# Patient Record
Sex: FEMALE | Race: WHITE | Employment: FULL TIME | URBAN - METROPOLITAN AREA
[De-identification: names, ages, dates, MRNs, and addresses within clinical notes are randomized per-mention and may not be internally consistent; named-entity substitution may affect disease eponyms.]

---

## 2023-10-26 ENCOUNTER — TELEPHONE (OUTPATIENT)
Dept: PRIMARY CARE | Facility: CLINIC | Age: 63
End: 2023-10-26

## 2023-10-26 ENCOUNTER — APPOINTMENT (OUTPATIENT)
Dept: PRIMARY CARE | Facility: CLINIC | Age: 63
End: 2023-10-26
Payer: COMMERCIAL

## 2023-10-26 DIAGNOSIS — I10 HYPERTENSION, UNSPECIFIED TYPE: ICD-10-CM

## 2023-10-26 DIAGNOSIS — E78.5 HYPERLIPIDEMIA, UNSPECIFIED HYPERLIPIDEMIA TYPE: ICD-10-CM

## 2023-10-26 DIAGNOSIS — E55.9 VITAMIN D DEFICIENCY: ICD-10-CM

## 2023-10-26 DIAGNOSIS — R73.01 IFG (IMPAIRED FASTING GLUCOSE): ICD-10-CM

## 2023-10-26 DIAGNOSIS — Z00.00 ROUTINE MEDICAL EXAM: ICD-10-CM

## 2023-11-07 ENCOUNTER — LAB (OUTPATIENT)
Dept: LAB | Facility: LAB | Age: 63
End: 2023-11-07
Payer: COMMERCIAL

## 2023-11-07 DIAGNOSIS — Z00.00 ROUTINE MEDICAL EXAM: ICD-10-CM

## 2023-11-07 DIAGNOSIS — I10 HYPERTENSION, UNSPECIFIED TYPE: ICD-10-CM

## 2023-11-07 DIAGNOSIS — E78.5 HYPERLIPIDEMIA, UNSPECIFIED HYPERLIPIDEMIA TYPE: ICD-10-CM

## 2023-11-07 DIAGNOSIS — R73.01 IFG (IMPAIRED FASTING GLUCOSE): ICD-10-CM

## 2023-11-07 DIAGNOSIS — E55.9 VITAMIN D DEFICIENCY: ICD-10-CM

## 2023-11-07 LAB
ALBUMIN SERPL BCP-MCNC: 4.6 G/DL (ref 3.4–5)
ALP SERPL-CCNC: 91 U/L (ref 33–136)
ALT SERPL W P-5'-P-CCNC: 19 U/L (ref 7–45)
ANION GAP SERPL CALC-SCNC: 11 MMOL/L (ref 10–20)
APPEARANCE UR: CLEAR
AST SERPL W P-5'-P-CCNC: 16 U/L (ref 9–39)
BILIRUB SERPL-MCNC: 1.2 MG/DL (ref 0–1.2)
BILIRUB UR STRIP.AUTO-MCNC: NEGATIVE MG/DL
BUN SERPL-MCNC: 10 MG/DL (ref 6–23)
CALCIUM SERPL-MCNC: 10.7 MG/DL (ref 8.6–10.3)
CHLORIDE SERPL-SCNC: 96 MMOL/L (ref 98–107)
CHOLEST SERPL-MCNC: 238 MG/DL (ref 0–199)
CHOLESTEROL/HDL RATIO: 3
CO2 SERPL-SCNC: 29 MMOL/L (ref 21–32)
COLOR UR: ABNORMAL
CREAT SERPL-MCNC: 0.65 MG/DL (ref 0.5–1.05)
GFR SERPL CREATININE-BSD FRML MDRD: >90 ML/MIN/1.73M*2
GLUCOSE SERPL-MCNC: 85 MG/DL (ref 74–99)
GLUCOSE UR STRIP.AUTO-MCNC: NEGATIVE MG/DL
HDLC SERPL-MCNC: 79.3 MG/DL
KETONES UR STRIP.AUTO-MCNC: NEGATIVE MG/DL
LDLC SERPL CALC-MCNC: 139 MG/DL
LEUKOCYTE ESTERASE UR QL STRIP.AUTO: NEGATIVE
NITRITE UR QL STRIP.AUTO: NEGATIVE
NON HDL CHOLESTEROL: 159 MG/DL (ref 0–149)
PH UR STRIP.AUTO: 7 [PH]
POTASSIUM SERPL-SCNC: 4.1 MMOL/L (ref 3.5–5.3)
PROT SERPL-MCNC: 7 G/DL (ref 6.4–8.2)
PROT UR STRIP.AUTO-MCNC: NEGATIVE MG/DL
RBC # UR STRIP.AUTO: ABNORMAL /UL
RBC #/AREA URNS AUTO: NORMAL /HPF
SODIUM SERPL-SCNC: 132 MMOL/L (ref 136–145)
SP GR UR STRIP.AUTO: 1
TRIGL SERPL-MCNC: 100 MG/DL (ref 0–149)
UROBILINOGEN UR STRIP.AUTO-MCNC: <2 MG/DL
VLDL: 20 MG/DL (ref 0–40)
WBC #/AREA URNS AUTO: NORMAL /HPF

## 2023-11-07 PROCEDURE — 36415 COLL VENOUS BLD VENIPUNCTURE: CPT

## 2023-11-07 PROCEDURE — 82570 ASSAY OF URINE CREATININE: CPT

## 2023-11-07 PROCEDURE — 82043 UR ALBUMIN QUANTITATIVE: CPT

## 2023-11-07 PROCEDURE — 81001 URINALYSIS AUTO W/SCOPE: CPT

## 2023-11-07 PROCEDURE — 82306 VITAMIN D 25 HYDROXY: CPT

## 2023-11-07 PROCEDURE — 80053 COMPREHEN METABOLIC PANEL: CPT

## 2023-11-07 PROCEDURE — 83036 HEMOGLOBIN GLYCOSYLATED A1C: CPT

## 2023-11-07 PROCEDURE — 80061 LIPID PANEL: CPT

## 2023-11-08 PROBLEM — H04.129 DRY EYE: Status: ACTIVE | Noted: 2023-11-08

## 2023-11-08 PROBLEM — Z97.3 WEARS EYEGLASSES: Status: ACTIVE | Noted: 2023-11-08

## 2023-11-08 PROBLEM — E78.49 OTHER HYPERLIPIDEMIA: Status: ACTIVE | Noted: 2019-06-11

## 2023-11-08 PROBLEM — H53.8 BLURRED VISION, BILATERAL: Status: ACTIVE | Noted: 2023-11-08

## 2023-11-08 PROBLEM — H25.13 NUCLEAR SCLEROSIS OF BOTH EYES: Status: ACTIVE | Noted: 2023-11-08

## 2023-11-08 PROBLEM — R73.01 IMPAIRED FASTING GLUCOSE: Status: ACTIVE | Noted: 2018-08-27

## 2023-11-08 PROBLEM — Z97.3 WEARS CONTACT LENSES: Status: ACTIVE | Noted: 2023-11-08

## 2023-11-08 PROBLEM — R92.8 ABNORMAL MAMMOGRAM: Status: ACTIVE | Noted: 2023-11-08

## 2023-11-08 PROBLEM — H57.10 PAIN IN AND AROUND EYE: Status: ACTIVE | Noted: 2023-11-08

## 2023-11-08 PROBLEM — F41.9 ANXIETY: Status: ACTIVE | Noted: 2019-08-29

## 2023-11-08 PROBLEM — I10 HYPERTENSION: Status: ACTIVE | Noted: 2018-08-27

## 2023-11-08 PROBLEM — E55.9 VITAMIN D DEFICIENCY: Status: ACTIVE | Noted: 2022-04-25

## 2023-11-08 PROBLEM — E83.52 HYPERCALCEMIA: Status: ACTIVE | Noted: 2021-09-15

## 2023-11-08 PROBLEM — H26.9 CATARACT OF RIGHT EYE: Status: ACTIVE | Noted: 2023-11-08

## 2023-11-08 LAB
25(OH)D3 SERPL-MCNC: 21 NG/ML (ref 30–100)
CREAT UR-MCNC: 17.7 MG/DL (ref 20–320)
EST. AVERAGE GLUCOSE BLD GHB EST-MCNC: 100 MG/DL
HBA1C MFR BLD: 5.1 %
MICROALBUMIN UR-MCNC: <7 MG/L
MICROALBUMIN/CREAT UR: ABNORMAL MG/G{CREAT}

## 2023-11-08 RX ORDER — IBUPROFEN 800 MG/1
TABLET ORAL
COMMUNITY
End: 2024-05-20 | Stop reason: ALTCHOICE

## 2023-11-08 RX ORDER — IBUPROFEN 200 MG
200 TABLET ORAL EVERY 6 HOURS PRN
COMMUNITY

## 2023-11-08 RX ORDER — LISINOPRIL 10 MG/1
10 TABLET ORAL DAILY
COMMUNITY
End: 2024-01-08

## 2023-11-08 RX ORDER — MUPIROCIN 20 MG/G
OINTMENT TOPICAL 3 TIMES DAILY
COMMUNITY
Start: 2017-07-09 | End: 2023-11-09 | Stop reason: WASHOUT

## 2023-11-09 ENCOUNTER — OFFICE VISIT (OUTPATIENT)
Dept: PRIMARY CARE | Facility: CLINIC | Age: 63
End: 2023-11-09
Payer: COMMERCIAL

## 2023-11-09 VITALS
DIASTOLIC BLOOD PRESSURE: 82 MMHG | SYSTOLIC BLOOD PRESSURE: 126 MMHG | HEIGHT: 66 IN | OXYGEN SATURATION: 98 % | BODY MASS INDEX: 34.87 KG/M2 | HEART RATE: 60 BPM | WEIGHT: 217 LBS

## 2023-11-09 DIAGNOSIS — E55.9 VITAMIN D DEFICIENCY: ICD-10-CM

## 2023-11-09 DIAGNOSIS — I10 HYPERTENSION, UNSPECIFIED TYPE: ICD-10-CM

## 2023-11-09 DIAGNOSIS — E78.5 HYPERLIPIDEMIA, UNSPECIFIED HYPERLIPIDEMIA TYPE: ICD-10-CM

## 2023-11-09 DIAGNOSIS — Z00.00 ROUTINE MEDICAL EXAM: Primary | ICD-10-CM

## 2023-11-09 DIAGNOSIS — R73.01 IFG (IMPAIRED FASTING GLUCOSE): ICD-10-CM

## 2023-11-09 DIAGNOSIS — E83.52 HYPERCALCEMIA: ICD-10-CM

## 2023-11-09 PROCEDURE — 99396 PREV VISIT EST AGE 40-64: CPT | Performed by: NURSE PRACTITIONER

## 2023-11-09 PROCEDURE — 3079F DIAST BP 80-89 MM HG: CPT | Performed by: NURSE PRACTITIONER

## 2023-11-09 PROCEDURE — 3074F SYST BP LT 130 MM HG: CPT | Performed by: NURSE PRACTITIONER

## 2023-11-09 PROCEDURE — 1036F TOBACCO NON-USER: CPT | Performed by: NURSE PRACTITIONER

## 2023-11-09 ASSESSMENT — PROMIS GLOBAL HEALTH SCALE
CARRYOUT_SOCIAL_ACTIVITIES: GOOD
RATE_MENTAL_HEALTH: FAIR
RATE_SOCIAL_SATISFACTION: FAIR
RATE_PHYSICAL_HEALTH: FAIR
RATE_QUALITY_OF_LIFE: FAIR
RATE_GENERAL_HEALTH: GOOD
RATE_AVERAGE_FATIGUE: MILD
EMOTIONAL_PROBLEMS: OFTEN
CARRYOUT_PHYSICAL_ACTIVITIES: COMPLETELY
RATE_AVERAGE_PAIN: 0

## 2023-11-09 ASSESSMENT — PAIN SCALES - GENERAL: PAINLEVEL: 0-NO PAIN

## 2023-11-09 NOTE — PROGRESS NOTES
"Subjective   Patient ID: Elvie Blunt is a 63 y.o. female who presents for CPE.    HPI   ELVIE is seen for her comprehensive physical exam.   PMH, PSH, family history and social history were reviewed and updated.     Diet is eats too much,  drinking alcohol  Drinking \"lots\"   Feels down/depressed  Has stressors at home  exercise - bikes walks  nonsmoker - quit 2007. Smoked socially    Has elevated calcium in past  Does not take calcium supplement    Tdap 2015  [id number]   refuses flu  Advised pneumovax  Shingrix 2022    Colonoscopy 2019 -Dr lynn repeat in 10 years    GYN history - Sees a Gynecologist. Dr MIAN Hale  pap 4/23  Mammogram 4/23    1. ELVIE is here for follow-up of hypertension. It was diagnosed years ago since 2006 Patient denies chest pain, shortness of breath and dizziness . ELVIE's current treatment is as listed in RX. She is compliant with treatment. BP at home:Systolic: 120's Diastolic: 80-90  2. ELVIE is seen today for follow-up of Hypercholesterolemia. w/ high HDL ELVIE has the following cardiac risk factors: hypertension. She has a history of no as complications of her lipid disorder.   3. ELVIE is seen also for impaired fasting glucose.   4. ELVIE is seen today also has aches and pain exercises regularly, takes Ibuprofen 800 occasionally, not regularly     BW reviewed    Review of Systems  Constitutional Symptoms: negative for fever, loss of appetite, headaches, fatigue.   Eyes: negative for loss and blurring of vision, double vision.   Ear, Nose, Mouth, Throat: negative for hearing loss, tinnitus, nasal congestion, rhinorrhea, nose bleeds, teeth problems, mouth sores, gum disease, dysphagia, sore throat.   Cardiovascular: negative for chest pain/pressure, palpitations, edema, claudication.   Respiratory: negative for shortness of breath, dyspnea on exertion, pain with breathing, coughing.   Breast: negative for tenderness, masses, gynecomastia.   Gastrointestinal: negative for anorexia, " indigestion, nausea, vomiting, abdominal pain, change in bowel habits, diarrhea, constipation, hematochaezia, melena, blood in stool.    : Negative for urinary or vaginal complaints  Musculoskeletal: negative for joint pain, joint swelling, myalgias, cramps.   Integumentary: negative for change in mole, skin trouble or rash.   Neurological: negative for headache, numbness, tingling, weakness, tremors.   Psychiatric: negative for depression, anxiety.   Endocrine: negative for weight gain, heat or cold intolerance, polyuria, polydipsia, polyphagia.   Hematologic/Lymphatic: negative for bruising, abnormal bleeding, swollen glands.      Objective   Vitals reviewed    Physical Exam  General Appearance: Comfortable. She is well nourished, and well developed. She is awake, alert, and oriented and appears her stated age. The patient is cooperative with exam.  Head: Hair pattern reveals a normal pattern for patients age and The face shows no abnormalities .  Eyes: PERRLA, EOMI, conjunctiva and sclerae clear. Extraocular muscle exam reveals EOMI.  Ears, Nose, Mouth, Throat: Bilateral canals are normal. Both tympanic membranes are pearly gray and landmarks normal .   NOSE: Nasal mucosa in both nostrils reveals no polyps, ulcerations, or lesions. Oral mucosa reveals no abnormalities and Teeth reveal good repair .  Neck: Neck reveals supple, no adenopathy, no thyromegaly, or carotid bruits.  Chest: Lungs are clear to auscultation bilaterally with no wheezes, rales, or rhonchi.  Cardiovascular: RRR without MRG.  Breast: defers.  Abdomen: Abdomen is soft, NT, ND with no masses.  Genitourinary: Defers  Lymph Nodes: Bilateral axillary lymph nodes are unremarkable. Bilateral inguinal lymph nodes are unremarkable.  Musculoskeletal: 5/5 and equal strength in bilateral upper and lower extremities.  Skin: Skin reveals good turgor and no rashes .  Neurological: Neuro: Intact and non-focal. Cranial nerves II - XII are grossly  intact.  Psychiatric: Patient has appropriate judgement. Patient has good insight. Patient's mood is appropriate.    Assessment/Plan   Diagnoses and all orders for this visit:  Routine medical exam  Continue current medications  healthy diet  DASH diet  exercise  Safety  Restart vit d   No calcium supplement  Recheck in 3 months - if not improving, see endo as previously discussed  Hypertension, unspecified type  Hyperlipidemia, unspecified hyperlipidemia type  IFG (impaired fasting glucose)  Vitamin D deficiency  Hypercalcemia  Other orders  -     Follow Up In Primary Care - Established; Future

## 2023-11-09 NOTE — PATIENT INSTRUCTIONS
Thank you for choosing our office for your healthcare needs    Please work on healthy lifestyle, choosing whole foods and regular exercise.    Please be sure to take 2000 IU vitamin D daily  Follow-up in 2 months to recheck your calcium and vitamin D levels    Please see the behavioral wellness group located in [facility] for counseling  They can be reached at [phone]

## 2024-02-15 ENCOUNTER — TELEPHONE (OUTPATIENT)
Dept: PRIMARY CARE | Facility: CLINIC | Age: 64
End: 2024-02-15
Payer: COMMERCIAL

## 2024-02-15 DIAGNOSIS — E55.9 VITAMIN D DEFICIENCY: ICD-10-CM

## 2024-02-15 DIAGNOSIS — I10 HYPERTENSION, UNSPECIFIED TYPE: ICD-10-CM

## 2024-02-22 ENCOUNTER — LAB (OUTPATIENT)
Dept: LAB | Facility: LAB | Age: 64
End: 2024-02-22
Payer: COMMERCIAL

## 2024-02-22 DIAGNOSIS — E55.9 VITAMIN D DEFICIENCY: ICD-10-CM

## 2024-02-22 DIAGNOSIS — I10 HYPERTENSION, UNSPECIFIED TYPE: ICD-10-CM

## 2024-02-22 LAB
25(OH)D3 SERPL-MCNC: 31 NG/ML (ref 31–100)
ALBUMIN SERPL-MCNC: 4.6 G/DL (ref 3.5–5)
ALP BLD-CCNC: 108 U/L (ref 35–125)
ALT SERPL-CCNC: 23 U/L (ref 5–40)
ANION GAP SERPL CALC-SCNC: 10 MMOL/L
AST SERPL-CCNC: 17 U/L (ref 5–40)
BILIRUB SERPL-MCNC: 1.2 MG/DL (ref 0.1–1.2)
BUN SERPL-MCNC: 10 MG/DL (ref 8–25)
CALCIUM SERPL-MCNC: 11 MG/DL (ref 8.5–10.4)
CHLORIDE SERPL-SCNC: 98 MMOL/L (ref 97–107)
CO2 SERPL-SCNC: 26 MMOL/L (ref 24–31)
CREAT SERPL-MCNC: 0.7 MG/DL (ref 0.4–1.6)
EGFRCR SERPLBLD CKD-EPI 2021: >90 ML/MIN/1.73M*2
GLUCOSE SERPL-MCNC: 98 MG/DL (ref 65–99)
PHOSPHATE SERPL-MCNC: 3 MG/DL (ref 2.5–4.5)
POTASSIUM SERPL-SCNC: 4.6 MMOL/L (ref 3.4–5.1)
PROT SERPL-MCNC: 7 G/DL (ref 5.9–7.9)
PTH-INTACT SERPL-MCNC: 89.4 PG/ML (ref 18.5–88)
SODIUM SERPL-SCNC: 134 MMOL/L (ref 133–145)

## 2024-02-22 PROCEDURE — 83970 ASSAY OF PARATHORMONE: CPT

## 2024-02-22 PROCEDURE — 84100 ASSAY OF PHOSPHORUS: CPT

## 2024-02-22 PROCEDURE — 82306 VITAMIN D 25 HYDROXY: CPT

## 2024-02-22 PROCEDURE — 80053 COMPREHEN METABOLIC PANEL: CPT

## 2024-02-22 PROCEDURE — 36415 COLL VENOUS BLD VENIPUNCTURE: CPT

## 2024-02-26 ENCOUNTER — TELEPHONE (OUTPATIENT)
Dept: PRIMARY CARE | Facility: CLINIC | Age: 64
End: 2024-02-26

## 2024-02-26 ENCOUNTER — OFFICE VISIT (OUTPATIENT)
Dept: PRIMARY CARE | Facility: CLINIC | Age: 64
End: 2024-02-26
Payer: COMMERCIAL

## 2024-02-26 VITALS
BODY MASS INDEX: 35.67 KG/M2 | HEART RATE: 74 BPM | OXYGEN SATURATION: 98 % | SYSTOLIC BLOOD PRESSURE: 126 MMHG | DIASTOLIC BLOOD PRESSURE: 80 MMHG | WEIGHT: 221 LBS

## 2024-02-26 DIAGNOSIS — I10 PRIMARY HYPERTENSION: ICD-10-CM

## 2024-02-26 DIAGNOSIS — Z00.00 ROUTINE MEDICAL EXAM: ICD-10-CM

## 2024-02-26 DIAGNOSIS — R79.89 ELEVATED PARATHYROID HORMONE: ICD-10-CM

## 2024-02-26 DIAGNOSIS — E83.52 HYPERCALCEMIA: Primary | ICD-10-CM

## 2024-02-26 DIAGNOSIS — R73.01 IMPAIRED FASTING GLUCOSE: ICD-10-CM

## 2024-02-26 DIAGNOSIS — E78.49 OTHER HYPERLIPIDEMIA: ICD-10-CM

## 2024-02-26 PROCEDURE — 99213 OFFICE O/P EST LOW 20 MIN: CPT | Performed by: NURSE PRACTITIONER

## 2024-02-26 PROCEDURE — 1036F TOBACCO NON-USER: CPT | Performed by: NURSE PRACTITIONER

## 2024-02-26 PROCEDURE — 3074F SYST BP LT 130 MM HG: CPT | Performed by: NURSE PRACTITIONER

## 2024-02-26 PROCEDURE — 3079F DIAST BP 80-89 MM HG: CPT | Performed by: NURSE PRACTITIONER

## 2024-02-26 ASSESSMENT — PAIN SCALES - GENERAL: PAINLEVEL: 0-NO PAIN

## 2024-02-26 NOTE — PROGRESS NOTES
Subjective   Patient ID: Elvie Blunt is a 63 y.o. female who presents for Follow-up (Labs.).    HPI   Elvie is a 64 yo F who presents for interval follow up  Pt has hx of elevated calcium in the past - she decreased calcium supplement and cut out high calcium foods. She is taking Vit D  Diet is varied and pt is active  Dexa 2022 normal  No complaints today    Bw reviewed    Review of Systems  Constitutional Symptoms: Negative for fever, loss of appetite, headaches, fatigue.   Cardiovascular: Negative for chest pain/pressure, palpitations, edema  Respiratory: Negative for shortness of breath, dyspnea on exertion, pain with breathing, coughing.   Gastrointestinal: Negative for nausea, vomiting, abdominal pain, change in bowel habits  Musculoskeletal: Negative for joint pain, joint swelling, myalgias, cramps.   Integumentary: Negative for skin trouble or rash.   Neurological: Negative for headache, numbness, tingling, weakness, tremors.   Psychiatric: Negative for depression, anxiety.   Endocrine: Negative for weight gain, heat or cold intolerance, polyuria, polydipsia, polyphagia.   Hematologic/Lymphatic: Negative for bruising, abnormal bleeding, swollen glands.     Objective   /80   Pulse 74   Wt 100 kg (221 lb)   SpO2 98%   BMI 35.67 kg/m²     Physical Exam  alert and oriented x3, NAD  Neck supple with no JVD  Lungs CTA bilaterally  Heart with RRR with no edema.  Abd obese, soft NT/ND  skin warm and dry  Neuro grossly intact     Assessment/Plan   Diagnoses and all orders for this visit:  Hypercalcemia  -     Referral to Endocrinology; Future  Calcium back up to 11  Referred to endo for evaluation  Elevated parathyroid hormone  -     Referral to Endocrinology; Future  Mildy elevated with elevated calcium  Referred to endo for evaluation  Primary hypertension  Continue current medications, healthy diet and exercise  Follow up 6 months  Other orders  -     Follow Up In Primary Care - Established

## 2024-03-15 ENCOUNTER — APPOINTMENT (OUTPATIENT)
Dept: PRIMARY CARE | Facility: CLINIC | Age: 64
End: 2024-03-15
Payer: COMMERCIAL

## 2024-05-20 ENCOUNTER — OFFICE VISIT (OUTPATIENT)
Dept: ENDOCRINOLOGY | Facility: CLINIC | Age: 64
End: 2024-05-20
Payer: COMMERCIAL

## 2024-05-20 VITALS
WEIGHT: 220.8 LBS | DIASTOLIC BLOOD PRESSURE: 80 MMHG | RESPIRATION RATE: 16 BRPM | HEART RATE: 76 BPM | HEIGHT: 66 IN | SYSTOLIC BLOOD PRESSURE: 130 MMHG | BODY MASS INDEX: 35.48 KG/M2

## 2024-05-20 DIAGNOSIS — E83.52 HYPERCALCEMIA: Primary | ICD-10-CM

## 2024-05-20 PROCEDURE — 3079F DIAST BP 80-89 MM HG: CPT | Performed by: INTERNAL MEDICINE

## 2024-05-20 PROCEDURE — 3075F SYST BP GE 130 - 139MM HG: CPT | Performed by: INTERNAL MEDICINE

## 2024-05-20 PROCEDURE — 99204 OFFICE O/P NEW MOD 45 MIN: CPT | Performed by: INTERNAL MEDICINE

## 2024-05-20 PROCEDURE — 1036F TOBACCO NON-USER: CPT | Performed by: INTERNAL MEDICINE

## 2024-05-20 RX ORDER — ACETAMINOPHEN 500 MG
TABLET ORAL DAILY
COMMUNITY

## 2024-05-20 ASSESSMENT — ENCOUNTER SYMPTOMS
FATIGUE: 0
COUGH: 0
DIARRHEA: 0
PALPITATIONS: 0
HEADACHES: 0
NAUSEA: 0
SHORTNESS OF BREATH: 0
VOMITING: 0
FEVER: 0
CHILLS: 0

## 2024-05-20 NOTE — PROGRESS NOTES
Endocrinology New Patient Consult  Subjective   Patient ID: Elvie Blunt is a 63 y.o. female who presents for Abnormal Calcium and Hyperparathyroidism. Patient was referred by Liz Cagle CNP.     PCP: ALBA Dos Santos-CNP    HPI  63-year-old referred by SHANTI Cagle CNP for evaluation of hypercalcemia.  She notes that her calcium has been elevated for the last several years and her primary care has been watching it.  She has no history of bone fractures or kidney stones or any family history of calcium issues that she knows of.  She did have a bone density in October 2022 that was normal.  She does not take calcium supplements but does take vitamin D 2000 international units a day.  She does not take Tums.  She has been feeling fine and denies any systemic complaints.  She is good about fluid intake.    Review of Systems   Constitutional:  Negative for chills, fatigue and fever.   Respiratory:  Negative for cough and shortness of breath.    Cardiovascular:  Negative for chest pain and palpitations.   Gastrointestinal:  Negative for diarrhea, nausea and vomiting.   Neurological:  Negative for headaches.       Patient Active Problem List   Diagnosis    Wears eyeglasses    Wears contact lenses    Vitamin D deficiency    Pain in and around eye    Other hyperlipidemia    Nuclear sclerosis of both eyes    Impaired fasting glucose    Hypertension    Hypercalcemia    Dry eye    Cataract of right eye    Blurred vision, bilateral    Anxiety    Abnormal mammogram       Past Medical History:   Diagnosis Date    Anxiety     Combined forms of age-related cataract, right eye 10/25/2019    Combined form of age-related cataract, right eye    Depression     Hypertension     Other specified health status     No pertinent past surgical history    Other specified health status     No pertinent past medical history    Substance abuse (Multi)     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    Refractive error        Past  Surgical History:   Procedure Laterality Date    OTHER SURGICAL HISTORY  10/09/2020    No history of surgery        Social History     Tobacco Use   Smoking Status Former    Current packs/day: 0.00    Average packs/day: 0.5 packs/day for 5.0 years (2.5 ttl pk-yrs)    Types: Cigarettes    Start date: 2002    Quit date: 2007    Years since quittin.8   Smokeless Tobacco Never   Tobacco Comments    on and off as a teenager more when mom was sick quit when she       Social History     Substance and Sexual Activity   Alcohol Use Yes    Alcohol/week: 18.0 standard drinks of alcohol    Types: 18 Glasses of wine per week    Comment: pandemic and stress really increased this      Marital status: Single  Employment:    Family History   Problem Relation Name Age of Onset    Arthritis Mother gautma     Hearing loss Mother gautam     Hypertension Mother gautam     Hypertension Brother joe         Home Meds:  Current Outpatient Medications   Medication Instructions    cholecalciferol (Vitamin D3) 50 mcg (2,000 unit) capsule oral, Daily    ibuprofen (ADVIL) 200 mg, oral, Every 6 hours PRN    lisinopril 10 mg, oral, Daily        No Known Allergies     Objective   Vitals:    24 1136   BP: 130/80   Pulse: 76   Resp: 16      Vitals:    24 1136   Weight: 100 kg (220 lb 12.8 oz)      Body mass index is 35.64 kg/m².   Physical Exam  Constitutional:       Appearance: Normal appearance. She is overweight.   HENT:      Head: Normocephalic and atraumatic.   Neck:      Thyroid: No thyroid mass, thyromegaly or thyroid tenderness.   Cardiovascular:      Rate and Rhythm: Normal rate and regular rhythm.      Heart sounds: No murmur heard.     No gallop.   Pulmonary:      Effort: Pulmonary effort is normal.      Breath sounds: Normal breath sounds.   Abdominal:      Palpations: Abdomen is soft.      Comments: benign   Neurological:      General: No focal deficit present.      Mental Status: She is alert and oriented  "to person, place, and time.      Deep Tendon Reflexes: Reflexes are normal and symmetric.   Psychiatric:         Behavior: Behavior is cooperative.         Labs:  Lab Results   Component Value Date    HGBA1C 5.1 11/07/2023    TSH 2.08 10/11/2022      No results found for: \"PR1\", \"THYROIDPAB\", \"TSI\"     Assessment/Plan   Problem List Items Addressed This Visit       Hypercalcemia - Primary    Relevant Orders    Parathyroid Hormone, Intact    Vitamin D 25-Hydroxy,Total (for eval of Vitamin D levels)    Renal Function Panel    Calcium, 24 Hour Urine    Creatinine, 24 Hour Urine     63-year-old here for evaluation of hypercalcemia.  We discussed her last blood work and elevated PTH value which would be consistent with hyperparathyroidism.  We discussed that this is a benign progressive process typically over many years.  We discussed parameters for deciding on need for surgery.  Her bone density was normal but her blood calcium has been increasing she has not had a 24-hour urine collection.  We discussed blood work and urine testing and decision based on those results.  In the meantime I encouraged her to stay well-hydrated.  She is to call or message with any concerns or  Electronically signed by:  Caridad Sanchez MD 05/20/24  12:42 PM    "

## 2024-05-20 NOTE — LETTER
May 20, 2024     MARCOS Dos Santos  [address] [place] [address]  [address]  [place] [address]    Patient: Elvie Blunt   YOB: 1960   Date of Visit: 5/20/2024       Dear MARCOS Watts:    Thank you for referring Elvie Blunt to me for evaluation. Below are my notes for this consultation.  If you have questions, please do not hesitate to call me. I look forward to following your patient along with you.       Sincerely,     Caridad Sanchez MD      CC: No Recipients  ______________________________________________________________________________________    Endocrinology New Patient Consult  Subjective  Patient ID: Elvie Blunt is a 63 y.o. female who presents for Abnormal Calcium and Hyperparathyroidism. Patient was referred by Liz Cagle CNP.     PCP: MARCOS Dos Santos    HPI  63-year-old referred by SHANTI Cagle CNP for evaluation of hypercalcemia.  She notes that her calcium has been elevated for the last several years and her primary care has been watching it.  She has no history of bone fractures or kidney stones or any family history of calcium issues that she knows of.  She did have a bone density in October 2022 that was normal.  She does not take calcium supplements but does take vitamin D 2000 international units a day.  She does not take Tums.  She has been feeling fine and denies any systemic complaints.  She is good about fluid intake.    Review of Systems   Constitutional:  Negative for chills, fatigue and fever.   Respiratory:  Negative for cough and shortness of breath.    Cardiovascular:  Negative for chest pain and palpitations.   Gastrointestinal:  Negative for diarrhea, nausea and vomiting.   Neurological:  Negative for headaches.       Patient Active Problem List   Diagnosis   • Wears eyeglasses   • Wears contact lenses   • Vitamin D deficiency   • Pain in and around eye   • Other hyperlipidemia   • Nuclear sclerosis of both eyes   • Impaired  fasting glucose   • Hypertension   • Hypercalcemia   • Dry eye   • Cataract of right eye   • Blurred vision, bilateral   • Anxiety   • Abnormal mammogram       Past Medical History:   Diagnosis Date   • Anxiety    • Combined forms of age-related cataract, right eye 10/25/2019    Combined form of age-related cataract, right eye   • Depression    • Hypertension    • Other specified health status     No pertinent past surgical history   • Other specified health status     No pertinent past medical history   • Substance abuse (Multi)    • 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    Refractive error        Past Surgical History:   Procedure Laterality Date   • OTHER SURGICAL HISTORY  10/09/2020    No history of surgery        Social History     Tobacco Use   Smoking Status Former   • Current packs/day: 0.00   • Average packs/day: 0.5 packs/day for 5.0 years (2.5 ttl pk-yrs)   • Types: Cigarettes   • Start date: 2002   • Quit date: 2007   • Years since quittin.8   Smokeless Tobacco Never   Tobacco Comments    on and off as a teenager more when mom was sick quit when she       Social History     Substance and Sexual Activity   Alcohol Use Yes   • Alcohol/week: 18.0 standard drinks of alcohol   • Types: 18 Glasses of wine per week    Comment: pandemic and stress really increased this      Marital status: Single  Employment:    Family History   Problem Relation Name Age of Onset   • Arthritis Mother gautam    • Hearing loss Mother gautam    • Hypertension Mother gautam    • Hypertension Brother joe         Home Meds:  Current Outpatient Medications   Medication Instructions   • cholecalciferol (Vitamin D3) 50 mcg (2,000 unit) capsule oral, Daily   • ibuprofen (ADVIL) 200 mg, oral, Every 6 hours PRN   • lisinopril 10 mg, oral, Daily        No Known Allergies     Objective  Vitals:    24 1136   BP: 130/80   Pulse: 76   Resp: 16      Vitals:    24 1136   Weight: 100 kg (220 lb 12.8 oz)     "  Body mass index is 35.64 kg/m².   Physical Exam  Constitutional:       Appearance: Normal appearance. She is overweight.   HENT:      Head: Normocephalic and atraumatic.   Neck:      Thyroid: No thyroid mass, thyromegaly or thyroid tenderness.   Cardiovascular:      Rate and Rhythm: Normal rate and regular rhythm.      Heart sounds: No murmur heard.     No gallop.   Pulmonary:      Effort: Pulmonary effort is normal.      Breath sounds: Normal breath sounds.   Abdominal:      Palpations: Abdomen is soft.      Comments: benign   Neurological:      General: No focal deficit present.      Mental Status: She is alert and oriented to person, place, and time.      Deep Tendon Reflexes: Reflexes are normal and symmetric.   Psychiatric:         Behavior: Behavior is cooperative.         Labs:  Lab Results   Component Value Date    HGBA1C 5.1 11/07/2023    TSH 2.08 10/11/2022      No results found for: \"PR1\", \"THYROIDPAB\", \"TSI\"     Assessment/Plan  Problem List Items Addressed This Visit       Hypercalcemia - Primary    Relevant Orders    Parathyroid Hormone, Intact    Vitamin D 25-Hydroxy,Total (for eval of Vitamin D levels)    Renal Function Panel    Calcium, 24 Hour Urine    Creatinine, 24 Hour Urine     63-year-old here for evaluation of hypercalcemia.  We discussed her last blood work and elevated PTH value which would be consistent with hyperparathyroidism.  We discussed that this is a benign progressive process typically over many years.  We discussed parameters for deciding on need for surgery.  Her bone density was normal but her blood calcium has been increasing she has not had a 24-hour urine collection.  We discussed blood work and urine testing and decision based on those results.  In the meantime I encouraged her to stay well-hydrated.  She is to call or message with any concerns or  Electronically signed by:  Caridad Sanchez MD 05/20/24  12:42 PM    "

## 2024-05-20 NOTE — PATIENT INSTRUCTIONS
Blood and urine testing when able  Further plans based on test results  Please call or message with any concerns or questions

## 2024-05-21 ENCOUNTER — LAB (OUTPATIENT)
Dept: LAB | Facility: LAB | Age: 64
End: 2024-05-21
Payer: COMMERCIAL

## 2024-05-21 DIAGNOSIS — E83.52 HYPERCALCEMIA: ICD-10-CM

## 2024-05-21 LAB
ALBUMIN SERPL BCP-MCNC: 4.5 G/DL (ref 3.4–5)
ANION GAP SERPL CALC-SCNC: 11 MMOL/L (ref 10–20)
BUN SERPL-MCNC: 7 MG/DL (ref 6–23)
CALCIUM SERPL-MCNC: 10.7 MG/DL (ref 8.6–10.3)
CHLORIDE SERPL-SCNC: 97 MMOL/L (ref 98–107)
CO2 SERPL-SCNC: 29 MMOL/L (ref 21–32)
CREAT SERPL-MCNC: 0.65 MG/DL (ref 0.5–1.05)
EGFRCR SERPLBLD CKD-EPI 2021: >90 ML/MIN/1.73M*2
GLUCOSE SERPL-MCNC: 100 MG/DL (ref 74–99)
PHOSPHATE SERPL-MCNC: 3.1 MG/DL (ref 2.5–4.9)
POTASSIUM SERPL-SCNC: 4.6 MMOL/L (ref 3.5–5.3)
SODIUM SERPL-SCNC: 132 MMOL/L (ref 136–145)

## 2024-05-21 PROCEDURE — 82306 VITAMIN D 25 HYDROXY: CPT

## 2024-05-21 PROCEDURE — 80069 RENAL FUNCTION PANEL: CPT

## 2024-05-21 PROCEDURE — 83970 ASSAY OF PARATHORMONE: CPT

## 2024-05-21 PROCEDURE — 36415 COLL VENOUS BLD VENIPUNCTURE: CPT

## 2024-05-22 LAB
25(OH)D3 SERPL-MCNC: 29 NG/ML (ref 30–100)
PTH-INTACT SERPL-MCNC: 83.4 PG/ML (ref 18.5–88)

## 2024-07-16 ENCOUNTER — APPOINTMENT (OUTPATIENT)
Dept: ENDOCRINOLOGY | Facility: CLINIC | Age: 64
End: 2024-07-16
Payer: COMMERCIAL

## 2024-08-16 ENCOUNTER — APPOINTMENT (OUTPATIENT)
Dept: ENDOCRINOLOGY | Facility: CLINIC | Age: 64
End: 2024-08-16
Payer: COMMERCIAL

## 2024-08-28 ENCOUNTER — LAB (OUTPATIENT)
Dept: LAB | Facility: LAB | Age: 64
End: 2024-08-28
Payer: COMMERCIAL

## 2024-08-28 DIAGNOSIS — E83.52 HYPERCALCEMIA: ICD-10-CM

## 2024-08-28 LAB
CALCIUM (MG/L) IN 24 HOUR URINE: 314 MG/24H (ref 100–300)
CALCIUM 24H UR-MRATE: 12.8 MG/DL
COLLECT DURATION TIME SPEC: 24 HRS
COLLECT DURATION TIME SPEC: 24 HRS
CREAT 24H UR-MCNC: 32.6 MG/DL
CREAT 24H UR-MCNC: 34.4 MG/DL (ref 20–320)
CREAT 24H UR-MRATE: 0.8 G/24 H
CREAT 24H UR-MRATE: 0.84 G/24 H (ref 0.67–1.59)
SPECIMEN VOL 24H UR: 2450 ML
SPECIMEN VOL 24H UR: 2450 ML

## 2024-08-28 PROCEDURE — 82340 ASSAY OF CALCIUM IN URINE: CPT

## 2024-08-28 PROCEDURE — 81050 URINALYSIS VOLUME MEASURE: CPT

## 2024-08-28 PROCEDURE — 82570 ASSAY OF URINE CREATININE: CPT

## 2024-09-09 ENCOUNTER — TELEPHONE (OUTPATIENT)
Dept: PRIMARY CARE | Facility: CLINIC | Age: 64
End: 2024-09-09
Payer: COMMERCIAL

## 2024-09-09 DIAGNOSIS — I10 PRIMARY HYPERTENSION: ICD-10-CM

## 2024-09-09 RX ORDER — LISINOPRIL 10 MG/1
10 TABLET ORAL DAILY
Qty: 90 TABLET | Refills: 0 | Status: SHIPPED | OUTPATIENT
Start: 2024-09-09

## 2024-11-07 ENCOUNTER — HOSPITAL ENCOUNTER (OUTPATIENT)
Dept: RADIOLOGY | Facility: HOSPITAL | Age: 64
Discharge: HOME | End: 2024-11-07
Payer: COMMERCIAL

## 2024-11-07 VITALS — BODY MASS INDEX: 35.36 KG/M2 | HEIGHT: 66 IN | WEIGHT: 220 LBS

## 2024-11-07 DIAGNOSIS — Z12.31 SCREENING MAMMOGRAM FOR BREAST CANCER: ICD-10-CM

## 2024-11-07 PROCEDURE — 77067 SCR MAMMO BI INCL CAD: CPT

## 2024-11-11 ENCOUNTER — APPOINTMENT (OUTPATIENT)
Dept: PRIMARY CARE | Facility: CLINIC | Age: 64
End: 2024-11-11
Payer: COMMERCIAL

## 2024-11-11 VITALS
SYSTOLIC BLOOD PRESSURE: 132 MMHG | DIASTOLIC BLOOD PRESSURE: 82 MMHG | TEMPERATURE: 97.2 F | WEIGHT: 219 LBS | HEART RATE: 57 BPM | BODY MASS INDEX: 35.2 KG/M2 | HEIGHT: 66 IN | OXYGEN SATURATION: 98 %

## 2024-11-11 DIAGNOSIS — E78.49 OTHER HYPERLIPIDEMIA: ICD-10-CM

## 2024-11-11 DIAGNOSIS — R73.01 IMPAIRED FASTING GLUCOSE: ICD-10-CM

## 2024-11-11 DIAGNOSIS — I10 PRIMARY HYPERTENSION: ICD-10-CM

## 2024-11-11 DIAGNOSIS — Z00.00 ROUTINE MEDICAL EXAM: Primary | ICD-10-CM

## 2024-11-11 PROCEDURE — 1036F TOBACCO NON-USER: CPT | Performed by: NURSE PRACTITIONER

## 2024-11-11 PROCEDURE — 99213 OFFICE O/P EST LOW 20 MIN: CPT | Performed by: NURSE PRACTITIONER

## 2024-11-11 PROCEDURE — 3075F SYST BP GE 130 - 139MM HG: CPT | Performed by: NURSE PRACTITIONER

## 2024-11-11 PROCEDURE — 3008F BODY MASS INDEX DOCD: CPT | Performed by: NURSE PRACTITIONER

## 2024-11-11 PROCEDURE — 3079F DIAST BP 80-89 MM HG: CPT | Performed by: NURSE PRACTITIONER

## 2024-11-11 PROCEDURE — 99396 PREV VISIT EST AGE 40-64: CPT | Performed by: NURSE PRACTITIONER

## 2024-11-11 ASSESSMENT — PROMIS GLOBAL HEALTH SCALE
RATE_AVERAGE_PAIN: 0
RATE_GENERAL_HEALTH: GOOD
CARRYOUT_SOCIAL_ACTIVITIES: GOOD
RATE_MENTAL_HEALTH: FAIR
EMOTIONAL_PROBLEMS: OFTEN
RATE_PHYSICAL_HEALTH: FAIR
CARRYOUT_PHYSICAL_ACTIVITIES: COMPLETELY
RATE_SOCIAL_SATISFACTION: FAIR
RATE_QUALITY_OF_LIFE: GOOD

## 2024-11-11 ASSESSMENT — PATIENT HEALTH QUESTIONNAIRE - PHQ9
1. LITTLE INTEREST OR PLEASURE IN DOING THINGS: NOT AT ALL
SUM OF ALL RESPONSES TO PHQ9 QUESTIONS 1 AND 2: 0
2. FEELING DOWN, DEPRESSED OR HOPELESS: NOT AT ALL

## 2024-11-11 ASSESSMENT — PAIN SCALES - GENERAL: PAINLEVEL_OUTOF10: 0-NO PAIN

## 2024-11-11 NOTE — PATIENT INSTRUCTIONS
Thank you for choosing our office for your healthcare needs.     Follow the Mediterranean diet - choosing whole foods, vegetables, lean proteins.   It is recommended to get 150 minutes of exercise per week - you can do this in short intervals or 30 minutes 5x per week.    Be sure to take in 1200 mg of Calcium and 1000 units of Vitamin D daily.  Choose whole foods and limit caffeine/alcohol. Perform 20 minutes of daily weight bearing exercise such as walking.  Smoking cessation is encouraged for all.

## 2024-11-11 NOTE — PROGRESS NOTES
"Subjective   Patient ID: Elvie Blunt is a 64 y.o. female who presents for Annual Exam.    HPI   ELVIE is seen for her comprehensive physical exam.   PMH, PSH, family history and social history were reviewed and updated.     Diet is \"trying\"  Walking/cycling  nonsmoker - quit 2007. Smoked socially  Patient recently started working with [employer] through her insurer.  This is a program to help with diet, exercise, lifestyle.  They have provided her with a Fitbit, CGM and blood pressure machine.  She will obtain blood work quarterly through them     Has elevated calcium in past  Does not take calcium supplement    Immunizations reviewed    Colonoscopy 2019 -Dr lynn repeat in 10 years    GYN history - Sees a Gynecologist. Dr MIAN Hale (retired)  pap 4/23  Mammogram 11/24    1. ELVIE is here for follow-up of hypertension. It was diagnosed years ago since 2006 Patient denies chest pain, shortness of breath and dizziness . ELVIE's current treatment is as listed in RX. She is compliant with treatment. BP at home:Systolic: 120's Diastolic: 80-90  2. ELVIE is seen today for follow-up of Hypercholesterolemia. w/ high HDL ELVIE has the following cardiac risk factors: hypertension. She has a history of no as complications of her lipid disorder.   3. ELVIE is seen also for impaired fasting glucose.   4. ELVIE has aches and pain exercises regularly, takes Ibuprofen 800 occasionally, not regularly   5. Sees Endo Dr Sanchez for elevated Ca. Dx with hyperparathyroidism. Will see every 6 months     Blood work from [employer] reviewed  7.3%  Review of Systems  Constitutional Symptoms: negative for fever, loss of appetite, headaches, fatigue.   Eyes: negative for loss and blurring of vision, double vision.   Ear, Nose, Mouth, Throat: negative for hearing loss, tinnitus, nasal congestion, rhinorrhea, nose bleeds, teeth problems, mouth sores, gum disease, dysphagia, sore throat.   Cardiovascular: negative for chest pain/pressure, " "palpitations, edema, claudication.   Respiratory: negative for shortness of breath, dyspnea on exertion, pain with breathing, coughing.   Breast: negative for tenderness, masses, gynecomastia.   Gastrointestinal: negative for anorexia, indigestion, nausea, vomiting, abdominal pain, change in bowel habits, diarrhea, constipation, hematochezia, melena, blood in stool.    : Negative for urinary or genital complaints  Musculoskeletal: negative for joint pain, joint swelling, myalgia, cramps.  Positive for left great toe pain and bunion  Integumentary: negative for change in mole, skin trouble or rash.   Neurological: negative for headache, numbness, tingling, weakness, tremors.   Psychiatric: negative for depression, anxiety.   Endocrine: negative for weight gain, heat or cold intolerance, polyuria, polydipsia, polyphagia.   Hematologic/Lymphatic: negative for bruising, abnormal bleeding, swollen glands     Objective   /82 (BP Location: Left arm, Patient Position: Sitting, BP Cuff Size: Large adult)   Pulse 57   Temp 36.2 °C (97.2 °F) (Temporal)   Ht 1.676 m (5' 6\")   Wt 99.3 kg (219 lb)   SpO2 98%   BMI 35.35 kg/m²     Physical Exam  General Appearance: Comfortable. She is well nourished, and well developed. She is awake, alert, and oriented and appears her stated age. The patient is cooperative with exam.  Head: Hair pattern reveals a normal pattern for patient's age and The face shows no abnormalities.  Eyes: PERRLA, EOMI, conjunctiva and sclera clear. Extraocular muscle exam reveals EOMI.  Ears, Nose, Mouth, Throat: Bilateral canals are normal. Both tympanic membranes are pearly gray and landmarks normal.    Nasal mucosa in both nostrils reveals no polyps, ulcerations, or lesions. Oral mucosa reveals no abnormalities and Teeth reveal good repair.  Neck: Neck reveals supple, no adenopathy, no thyromegaly, or carotid bruits.  Chest: Lungs are clear to auscultation bilaterally with no wheezes, rales, or " rhonchi.  Cardiovascular: RRR without MRG. No edema  Breast: Deferred   abdomen: Abdomen with normoactive bowel sounds x4 quads, Abd is soft, NT, ND with no masses.  Genitourinary: Deferred   musculoskeletal: 5/5 and equal strength in bilateral upper and lower extremities.  Skin: Skin reveals good turgor and no rashes.  Neurological: Intact and non-focal. Cranial nerves II - XII are grossly intact.  Psychiatric: Patient has appropriate judgement. Patient has good insight. Patient's mood is appropriate.     Assessment/Plan   Diagnoses and all orders for this visit:  Routine medical exam  63 yo F well exam  Preventative screenings reviewed  Immunizations reviewed  Mediterranean diet, exercise, safety  Follow up 6 months  Primary hypertension  -     CT cardiac scoring wo IV contrast; Future  BP stable on lisinopril 10 mg  Continue current medications  Mediterranean diet/CRISTOBAL diet  Exercise  Work with [employer] through her insurance  CT coronary calcium score ordered  Monitor bp and record  Follow up 6 months    Other hyperlipidemia  -     CT cardiac scoring wo IV contrast; Future  Total cholesterol 243, , HDL 67, triglycerides 122  High-sensitivity CRP 1.0  ASCVD risk score calculated at 7.3%  Cardiovascular risks discussed and, if needed, lifestyle modifications recommended, including nutritional choices, exercise, and elimination of habits contributing to risk.  We agreed on a plan to reduce the current cardiovascular risk.  Aspirin use/tissues was discussed after reviewing updated guidelines  Patient has started program with [employer] through her insurer.  She will work on healthy lifestyle interventions.  We will recheck in 6 months  CT coronary calcium score ordered  Should her ASCVD score increase or if her CT coronary calcium score is elevated, we will consider statin  Impaired fasting glucose  Glucose 101 with A1c 5.4  Continue therapeutic lifestyle interventions through [employer]  Recheck 6  months  Other orders  -     Follow Up In Primary Care - Established; Future

## 2024-12-11 ENCOUNTER — TELEPHONE (OUTPATIENT)
Dept: PRIMARY CARE | Facility: CLINIC | Age: 64
End: 2024-12-11
Payer: COMMERCIAL

## 2024-12-11 DIAGNOSIS — I10 PRIMARY HYPERTENSION: ICD-10-CM

## 2024-12-11 RX ORDER — LISINOPRIL 10 MG/1
10 TABLET ORAL DAILY
Qty: 90 TABLET | Refills: 0 | Status: SHIPPED | OUTPATIENT
Start: 2024-12-11

## 2024-12-11 NOTE — TELEPHONE ENCOUNTER
Patient called RX line for refill of Lisinopril 10 mg to go to [facility] on [address].  Last seen 11-11-24

## 2025-02-24 ENCOUNTER — APPOINTMENT (OUTPATIENT)
Dept: ENDOCRINOLOGY | Facility: CLINIC | Age: 65
End: 2025-02-24
Payer: COMMERCIAL

## 2025-02-24 VITALS
DIASTOLIC BLOOD PRESSURE: 84 MMHG | BODY MASS INDEX: 32.4 KG/M2 | WEIGHT: 201.6 LBS | HEIGHT: 66 IN | HEART RATE: 84 BPM | RESPIRATION RATE: 16 BRPM | SYSTOLIC BLOOD PRESSURE: 130 MMHG

## 2025-02-24 DIAGNOSIS — E83.52 HYPERCALCEMIA: Primary | ICD-10-CM

## 2025-02-24 DIAGNOSIS — Z78.0 MENOPAUSE: ICD-10-CM

## 2025-02-24 PROCEDURE — 99213 OFFICE O/P EST LOW 20 MIN: CPT | Performed by: INTERNAL MEDICINE

## 2025-02-24 PROCEDURE — 3079F DIAST BP 80-89 MM HG: CPT | Performed by: INTERNAL MEDICINE

## 2025-02-24 PROCEDURE — 3008F BODY MASS INDEX DOCD: CPT | Performed by: INTERNAL MEDICINE

## 2025-02-24 PROCEDURE — 1036F TOBACCO NON-USER: CPT | Performed by: INTERNAL MEDICINE

## 2025-02-24 PROCEDURE — 3075F SYST BP GE 130 - 139MM HG: CPT | Performed by: INTERNAL MEDICINE

## 2025-02-24 ASSESSMENT — ENCOUNTER SYMPTOMS
PALPITATIONS: 0
HEADACHES: 0
FEVER: 0
NAUSEA: 0
CHILLS: 0
DIARRHEA: 0
VOMITING: 0
SHORTNESS OF BREATH: 0
FATIGUE: 0
COUGH: 0

## 2025-02-24 NOTE — ASSESSMENT & PLAN NOTE
Discussed course  Labs and dexa both due  If ok/stable will follow up in 6 months and recheck labs and urine at that time and decide on surgical referral  Orders:    Comprehensive Metabolic Panel; Future    Parathyroid Hormone, Intact; Future    Vitamin D 25-Hydroxy,Total (for eval of Vitamin D levels); Future

## 2025-02-24 NOTE — PROGRESS NOTES
Endocrinology: Follow up visit  Subjective   Patient ID: Elvie Blunt is a 64 y.o. female who presents for Hyperparathyroidism and Abnormal Calcium.    PCP: ALBA Dos Santos-CNP    HPI  Here for follow up mild primary hyperpara.   Last visit urine testing mildly elevated and discussed surgical referral vs monitoring and she preferred monitoring.   Feeling well  Working hard on weight loss plan.    Review of Systems   Constitutional:  Negative for chills, fatigue and fever.   Respiratory:  Negative for cough and shortness of breath.    Cardiovascular:  Negative for chest pain and palpitations.   Gastrointestinal:  Negative for diarrhea, nausea and vomiting.   Neurological:  Negative for headaches.       Patient Active Problem List   Diagnosis    Wears eyeglasses    Wears contact lenses    Vitamin D deficiency    Pain in and around eye    Other hyperlipidemia    Nuclear sclerosis of both eyes    Impaired fasting glucose    Hypertension    Hypercalcemia    Dry eye    Cataract of right eye    Blurred vision, bilateral    Anxiety    Abnormal mammogram        Home Meds:  Current Outpatient Medications   Medication Instructions    cholecalciferol (Vitamin D3) 50 mcg (2,000 unit) capsule Daily    ibuprofen (ADVIL) 200 mg, Every 6 hours PRN    lisinopril 10 mg, oral, Daily    MAGNESIUM GLYCINATE ORAL Take by mouth.        No Known Allergies     Objective   Vitals:    02/24/25 0822   BP: 130/84   Pulse: 84   Resp: 16      Vitals:    02/24/25 0822   Weight: 91.4 kg (201 lb 9.6 oz)      Body mass index is 32.54 kg/m².   Physical Exam  Constitutional:       Appearance: Normal appearance. She is overweight.   HENT:      Head: Normocephalic and atraumatic.   Neck:      Thyroid: No thyroid mass, thyromegaly or thyroid tenderness.   Cardiovascular:      Rate and Rhythm: Normal rate and regular rhythm.      Heart sounds: No murmur heard.     No gallop.   Pulmonary:      Effort: Pulmonary effort is normal.      Breath  "sounds: Normal breath sounds.   Abdominal:      Palpations: Abdomen is soft.      Comments: benign   Neurological:      General: No focal deficit present.      Mental Status: She is alert and oriented to person, place, and time.      Deep Tendon Reflexes: Reflexes are normal and symmetric.   Psychiatric:         Behavior: Behavior is cooperative.         Labs:  Lab Results   Component Value Date    HGBA1C 5.1 11/07/2023    TSH 2.08 10/11/2022      No results found for: \"PR1\", \"THYROIDPAB\", \"TSI\"     Assessment/Plan   Assessment & Plan  Hypercalcemia  Discussed course  Labs and dexa both due  If ok/stable will follow up in 6 months and recheck labs and urine at that time and decide on surgical referral  Orders:    Comprehensive Metabolic Panel; Future    Parathyroid Hormone, Intact; Future    Vitamin D 25-Hydroxy,Total (for eval of Vitamin D levels); Future    Menopause    Orders:    XR DEXA bone density; Future        Electronically signed by:  Caridad Sanchez MD 02/24/25 8:28 AM              "

## 2025-02-25 ENCOUNTER — HOSPITAL ENCOUNTER (OUTPATIENT)
Dept: RADIOLOGY | Facility: HOSPITAL | Age: 65
Discharge: HOME | End: 2025-02-25
Payer: COMMERCIAL

## 2025-02-25 DIAGNOSIS — Z78.0 MENOPAUSE: ICD-10-CM

## 2025-02-25 LAB
25(OH)D3+25(OH)D2 SERPL-MCNC: 28 NG/ML (ref 30–100)
ALBUMIN SERPL-MCNC: 4.9 G/DL (ref 3.6–5.1)
ALP SERPL-CCNC: 95 U/L (ref 37–153)
ALT SERPL-CCNC: 19 U/L (ref 6–29)
ANION GAP SERPL CALCULATED.4IONS-SCNC: 9 MMOL/L (CALC) (ref 7–17)
AST SERPL-CCNC: 17 U/L (ref 10–35)
BILIRUB SERPL-MCNC: 0.9 MG/DL (ref 0.2–1.2)
BUN SERPL-MCNC: 11 MG/DL (ref 7–25)
CALCIUM SERPL-MCNC: 11 MG/DL (ref 8.6–10.4)
CHLORIDE SERPL-SCNC: 97 MMOL/L (ref 98–110)
CO2 SERPL-SCNC: 26 MMOL/L (ref 20–32)
CREAT SERPL-MCNC: 0.65 MG/DL (ref 0.5–1.05)
EGFRCR SERPLBLD CKD-EPI 2021: 99 ML/MIN/1.73M2
GLUCOSE SERPL-MCNC: 101 MG/DL (ref 65–139)
POTASSIUM SERPL-SCNC: 4.4 MMOL/L (ref 3.5–5.3)
PROT SERPL-MCNC: 7.1 G/DL (ref 6.1–8.1)
PTH-INTACT SERPL-MCNC: 54 PG/ML (ref 16–77)
SODIUM SERPL-SCNC: 132 MMOL/L (ref 135–146)

## 2025-02-25 PROCEDURE — 77080 DXA BONE DENSITY AXIAL: CPT | Performed by: RADIOLOGY

## 2025-02-25 PROCEDURE — 77080 DXA BONE DENSITY AXIAL: CPT

## 2025-02-28 ENCOUNTER — TELEPHONE (OUTPATIENT)
Dept: PRIMARY CARE | Facility: CLINIC | Age: 65
End: 2025-02-28
Payer: COMMERCIAL

## 2025-02-28 DIAGNOSIS — E78.49 OTHER HYPERLIPIDEMIA: ICD-10-CM

## 2025-02-28 DIAGNOSIS — I10 PRIMARY HYPERTENSION: ICD-10-CM

## 2025-02-28 DIAGNOSIS — R73.01 IFG (IMPAIRED FASTING GLUCOSE): ICD-10-CM

## 2025-02-28 RX ORDER — LISINOPRIL 10 MG/1
10 TABLET ORAL DAILY
Qty: 90 TABLET | Refills: 0 | Status: SHIPPED | OUTPATIENT
Start: 2025-02-28

## 2025-02-28 NOTE — TELEPHONE ENCOUNTER
Rx Refill Request Telephone Encounter    Name:  Elvie Blunt  :  [id number]  Medication Name:  Lisinopril 10 mg, will be out before appt            Specific Pharmacy location:  [facility]  Date of last appointment:    Date of next appointment:  4-10-25  Best number to reach patient:

## 2025-03-19 ENCOUNTER — HOSPITAL ENCOUNTER (OUTPATIENT)
Dept: RADIOLOGY | Facility: HOSPITAL | Age: 65
Discharge: HOME | End: 2025-03-19

## 2025-03-19 DIAGNOSIS — E78.49 OTHER HYPERLIPIDEMIA: ICD-10-CM

## 2025-03-19 DIAGNOSIS — I10 PRIMARY HYPERTENSION: ICD-10-CM

## 2025-03-19 PROCEDURE — 75571 CT HRT W/O DYE W/CA TEST: CPT

## 2025-03-26 DIAGNOSIS — R73.01 IFG (IMPAIRED FASTING GLUCOSE): ICD-10-CM

## 2025-03-26 DIAGNOSIS — E78.49 OTHER HYPERLIPIDEMIA: ICD-10-CM

## 2025-03-26 DIAGNOSIS — I10 PRIMARY HYPERTENSION: ICD-10-CM

## 2025-04-06 LAB
ALBUMIN SERPL-MCNC: 4.4 G/DL (ref 3.6–5.1)
ALBUMIN/CREAT UR: NORMAL
ALP SERPL-CCNC: 90 U/L (ref 37–153)
ALT SERPL-CCNC: 19 U/L (ref 6–29)
ANION GAP SERPL CALCULATED.4IONS-SCNC: 9 MMOL/L (CALC) (ref 7–17)
APPEARANCE UR: CLEAR
AST SERPL-CCNC: 16 U/L (ref 10–35)
BILIRUB SERPL-MCNC: 1.2 MG/DL (ref 0.2–1.2)
BILIRUB UR QL STRIP: NEGATIVE
BUN SERPL-MCNC: 11 MG/DL (ref 7–25)
CALCIUM SERPL-MCNC: 10.5 MG/DL (ref 8.6–10.4)
CHLORIDE SERPL-SCNC: 98 MMOL/L (ref 98–110)
CHOLEST SERPL-MCNC: 190 MG/DL
CHOLEST/HDLC SERPL: 2.5 (CALC)
CO2 SERPL-SCNC: 27 MMOL/L (ref 20–32)
COLOR UR: YELLOW
CREAT SERPL-MCNC: 0.6 MG/DL (ref 0.5–1.05)
CREAT UR-MCNC: NORMAL MG/DL
EGFRCR SERPLBLD CKD-EPI 2021: 101 ML/MIN/1.73M2
EST. AVERAGE GLUCOSE BLD GHB EST-MCNC: 103 MG/DL
EST. AVERAGE GLUCOSE BLD GHB EST-SCNC: 5.7 MMOL/L
GLUCOSE SERPL-MCNC: 100 MG/DL (ref 65–99)
GLUCOSE UR QL STRIP: NEGATIVE
HBA1C MFR BLD: 5.2 % OF TOTAL HGB
HDLC SERPL-MCNC: 77 MG/DL
HGB UR QL STRIP: NEGATIVE
KETONES UR QL STRIP: NEGATIVE
LDLC SERPL CALC-MCNC: 97 MG/DL (CALC)
LEUKOCYTE ESTERASE UR QL STRIP: NEGATIVE
MICROALBUMIN UR-MCNC: NORMAL
NITRITE UR QL STRIP: NEGATIVE
NONHDLC SERPL-MCNC: 113 MG/DL (CALC)
PH UR STRIP: 7.5 [PH] (ref 5–8)
POTASSIUM SERPL-SCNC: 4.3 MMOL/L (ref 3.5–5.3)
PROT SERPL-MCNC: 6.6 G/DL (ref 6.1–8.1)
PROT UR QL STRIP: NEGATIVE
SODIUM SERPL-SCNC: 134 MMOL/L (ref 135–146)
SP GR UR STRIP: 1.01 (ref 1–1.03)
TRIGL SERPL-MCNC: 75 MG/DL

## 2025-04-07 LAB
ALBUMIN SERPL-MCNC: 4.4 G/DL (ref 3.6–5.1)
ALBUMIN/CREAT UR: 6 MG/G CREAT
ALP SERPL-CCNC: 90 U/L (ref 37–153)
ALT SERPL-CCNC: 19 U/L (ref 6–29)
ANION GAP SERPL CALCULATED.4IONS-SCNC: 9 MMOL/L (CALC) (ref 7–17)
APPEARANCE UR: CLEAR
AST SERPL-CCNC: 16 U/L (ref 10–35)
BILIRUB SERPL-MCNC: 1.2 MG/DL (ref 0.2–1.2)
BILIRUB UR QL STRIP: NEGATIVE
BUN SERPL-MCNC: 11 MG/DL (ref 7–25)
CALCIUM SERPL-MCNC: 10.5 MG/DL (ref 8.6–10.4)
CHLORIDE SERPL-SCNC: 98 MMOL/L (ref 98–110)
CHOLEST SERPL-MCNC: 190 MG/DL
CHOLEST/HDLC SERPL: 2.5 (CALC)
CO2 SERPL-SCNC: 27 MMOL/L (ref 20–32)
COLOR UR: YELLOW
CREAT SERPL-MCNC: 0.6 MG/DL (ref 0.5–1.05)
CREAT UR-MCNC: 62 MG/DL (ref 20–275)
EGFRCR SERPLBLD CKD-EPI 2021: 101 ML/MIN/1.73M2
EST. AVERAGE GLUCOSE BLD GHB EST-MCNC: 103 MG/DL
EST. AVERAGE GLUCOSE BLD GHB EST-SCNC: 5.7 MMOL/L
GLUCOSE SERPL-MCNC: 100 MG/DL (ref 65–99)
GLUCOSE UR QL STRIP: NEGATIVE
HBA1C MFR BLD: 5.2 % OF TOTAL HGB
HDLC SERPL-MCNC: 77 MG/DL
HGB UR QL STRIP: NEGATIVE
KETONES UR QL STRIP: NEGATIVE
LDLC SERPL CALC-MCNC: 97 MG/DL (CALC)
LEUKOCYTE ESTERASE UR QL STRIP: NEGATIVE
MICROALBUMIN UR-MCNC: 0.4 MG/DL
NITRITE UR QL STRIP: NEGATIVE
NONHDLC SERPL-MCNC: 113 MG/DL (CALC)
PH UR STRIP: 7.5 [PH] (ref 5–8)
POTASSIUM SERPL-SCNC: 4.3 MMOL/L (ref 3.5–5.3)
PROT SERPL-MCNC: 6.6 G/DL (ref 6.1–8.1)
PROT UR QL STRIP: NEGATIVE
SODIUM SERPL-SCNC: 134 MMOL/L (ref 135–146)
SP GR UR STRIP: 1.01 (ref 1–1.03)
TRIGL SERPL-MCNC: 75 MG/DL

## 2025-04-10 ENCOUNTER — APPOINTMENT (OUTPATIENT)
Dept: PRIMARY CARE | Facility: CLINIC | Age: 65
End: 2025-04-10
Payer: COMMERCIAL

## 2025-04-10 ENCOUNTER — TELEPHONE (OUTPATIENT)
Dept: PRIMARY CARE | Facility: CLINIC | Age: 65
End: 2025-04-10

## 2025-04-10 VITALS
DIASTOLIC BLOOD PRESSURE: 80 MMHG | HEIGHT: 66 IN | WEIGHT: 194.6 LBS | SYSTOLIC BLOOD PRESSURE: 140 MMHG | HEART RATE: 51 BPM | OXYGEN SATURATION: 95 % | TEMPERATURE: 97.2 F | BODY MASS INDEX: 31.27 KG/M2

## 2025-04-10 DIAGNOSIS — I10 PRIMARY HYPERTENSION: Primary | ICD-10-CM

## 2025-04-10 DIAGNOSIS — E78.2 MIXED HYPERLIPIDEMIA: ICD-10-CM

## 2025-04-10 DIAGNOSIS — E55.9 VITAMIN D DEFICIENCY: ICD-10-CM

## 2025-04-10 DIAGNOSIS — I10 PRIMARY HYPERTENSION: ICD-10-CM

## 2025-04-10 PROCEDURE — 3008F BODY MASS INDEX DOCD: CPT

## 2025-04-10 PROCEDURE — 99214 OFFICE O/P EST MOD 30 MIN: CPT

## 2025-04-10 PROCEDURE — 3079F DIAST BP 80-89 MM HG: CPT

## 2025-04-10 PROCEDURE — 3077F SYST BP >= 140 MM HG: CPT

## 2025-04-10 PROCEDURE — G8433 SCR FOR DEP NOT CPT DOC RSN: HCPCS

## 2025-04-10 PROCEDURE — 1036F TOBACCO NON-USER: CPT

## 2025-04-10 RX ORDER — LISINOPRIL 20 MG/1
20 TABLET ORAL DAILY
Qty: 90 TABLET | Refills: 1 | Status: SHIPPED | OUTPATIENT
Start: 2025-04-10

## 2025-04-10 ASSESSMENT — ENCOUNTER SYMPTOMS
SHORTNESS OF BREATH: 0
NECK PAIN: 1
HEADACHES: 0
PALPITATIONS: 0
ORTHOPNEA: 0
HYPERTENSION: 1
BLURRED VISION: 0
SWEATS: 0
PND: 0

## 2025-04-10 ASSESSMENT — COLUMBIA-SUICIDE SEVERITY RATING SCALE - C-SSRS
1. IN THE PAST MONTH, HAVE YOU WISHED YOU WERE DEAD OR WISHED YOU COULD GO TO SLEEP AND NOT WAKE UP?: NO
2. HAVE YOU ACTUALLY HAD ANY THOUGHTS OF KILLING YOURSELF?: NO
6. HAVE YOU EVER DONE ANYTHING, STARTED TO DO ANYTHING, OR PREPARED TO DO ANYTHING TO END YOUR LIFE?: NO

## 2025-04-10 ASSESSMENT — PATIENT HEALTH QUESTIONNAIRE - PHQ9
2. FEELING DOWN, DEPRESSED OR HOPELESS: NOT AT ALL
1. LITTLE INTEREST OR PLEASURE IN DOING THINGS: NOT AT ALL
SUM OF ALL RESPONSES TO PHQ9 QUESTIONS 1 AND 2: 0

## 2025-04-10 NOTE — ASSESSMENT & PLAN NOTE
Chronic condition, BP remains above goal   Increase lisinopril to 20 mg daily   Explained intended effects, potential side effects, and schedule of dosages of the medication.  Continue to monitor home BP's, report readings consistently > 140/90.  Continue with the healthy diet and exercise interventions you have put into place over the last 6 months.   Follow up in 6 months.   Orders:    lisinopril 20 mg tablet; Take 1 tablet (20 mg) by mouth once daily.

## 2025-04-10 NOTE — PROGRESS NOTES
Subjective     Patient ID: Elvie Blunt is a 64 y.o. female who presents for Follow-up.      Elvie presents for hypertension follow-up.  She is a new patient to this provider, establishing care today previously patient of [facility].  She has also recently been following with endocrinology for hypercalcemia/mild hyperparathyroidism.  Current plan is to continue to monitor although surgical intervention was discussed.      She has been working on weight loss over the last 6+ months.  She has been using [facility] through work. Has lost 25 lbs through diet modifications and increased movement/exercise.     Hypertension  This is a chronic problem. The current episode started more than 1 year ago. The problem has been waxing and waning since onset. The problem is controlled. Associated symptoms include anxiety and neck pain. Pertinent negatives include no blurred vision, chest pain, headaches, malaise/fatigue, orthopnea, palpitations, peripheral edema, PND, shortness of breath or sweats. There are no associated agents to hypertension. Risk factors for coronary artery disease include obesity, post-menopausal state, stress and sedentary lifestyle (History of smoking). Past treatments include ACE inhibitors. There are no compliance problems.    She checks her home blood pressures, 120s-140s/80-90s.     Patient's recent visit notes, medication and allergy lists, past medical surgical social hx, immunization, vitals, problem list, recent tests were reviewed by me for pertinence to this visit.      Review of Systems   Constitutional:  Negative for malaise/fatigue.   Eyes:  Negative for blurred vision.   Respiratory:  Negative for shortness of breath.    Cardiovascular:  Negative for chest pain, palpitations, orthopnea and PND.   Musculoskeletal:  Positive for neck pain.   Neurological:  Negative for headaches.         Objective   /80 (BP Location: Left arm, Patient Position: Sitting, BP Cuff Size: Adult)   Pulse 51  "  Temp 36.2 °C (97.2 °F) (Temporal)   Ht 1.676 m (5' 6\")   Wt 88.3 kg (194 lb 9.6 oz)   SpO2 95%   BMI 31.41 kg/m²       Physical Exam  Vitals and nursing note reviewed.   Constitutional:       General: She is not in acute distress.     Appearance: Normal appearance.   Neck:      Vascular: No carotid bruit.   Cardiovascular:      Rate and Rhythm: Normal rate and regular rhythm.      Pulses: Normal pulses.      Heart sounds: Normal heart sounds, S1 normal and S2 normal. No murmur heard.  Pulmonary:      Effort: Pulmonary effort is normal.      Breath sounds: Normal breath sounds and air entry.   Musculoskeletal:      Cervical back: Normal range of motion and neck supple.      Right lower leg: No edema.      Left lower leg: No edema.   Lymphadenopathy:      Cervical: No cervical adenopathy.   Skin:     General: Skin is warm and dry.   Neurological:      General: No focal deficit present.      Mental Status: She is alert. Mental status is at baseline.   Psychiatric:         Behavior: Behavior is cooperative.             Assessment & Plan  Primary hypertension  Chronic condition, BP remains above goal   Increase lisinopril to 20 mg daily   Explained intended effects, potential side effects, and schedule of dosages of the medication.  Continue to monitor home BP's, report readings consistently > 140/90.  Continue with the healthy diet and exercise interventions you have put into place over the last 6 months.   Follow up in 6 months.   Orders:    lisinopril 20 mg tablet; Take 1 tablet (20 mg) by mouth once daily.    Mixed hyperlipidemia  Chronic condition, much improved with diet and exercise changes.  Continue with healthy diet and exercise as discussed.  ASCVD risk slightly elevated at 5.9%.  Discussed results from CT calcium scoring which is low.  We will follow-up in 6 months to repeat levels.         Megan Bullock, APRN-CNP  "

## 2025-05-12 ENCOUNTER — APPOINTMENT (OUTPATIENT)
Dept: PRIMARY CARE | Facility: CLINIC | Age: 65
End: 2025-05-12
Payer: COMMERCIAL

## 2025-05-15 ENCOUNTER — TELEPHONE (OUTPATIENT)
Dept: PRIMARY CARE | Facility: CLINIC | Age: 65
End: 2025-05-15

## 2025-05-15 DIAGNOSIS — I10 PRIMARY HYPERTENSION: ICD-10-CM

## 2025-05-15 RX ORDER — LISINOPRIL 10 MG/1
10 TABLET ORAL DAILY
Qty: 90 TABLET | Refills: 0 | OUTPATIENT
Start: 2025-05-15

## 2025-05-15 NOTE — TELEPHONE ENCOUNTER
Patient called back, she said to disregard message about refill of Lisinopril. She forgot to cancel this at [facility] now that she gets the mail order from [facility].

## 2025-07-09 ENCOUNTER — OFFICE VISIT (OUTPATIENT)
Dept: PRIMARY CARE | Facility: CLINIC | Age: 65
End: 2025-07-09
Payer: COMMERCIAL

## 2025-07-09 VITALS
HEART RATE: 64 BPM | TEMPERATURE: 98 F | HEIGHT: 66 IN | SYSTOLIC BLOOD PRESSURE: 136 MMHG | DIASTOLIC BLOOD PRESSURE: 88 MMHG | OXYGEN SATURATION: 99 % | WEIGHT: 182 LBS | BODY MASS INDEX: 29.25 KG/M2

## 2025-07-09 DIAGNOSIS — R00.1 BRADYCARDIA: Primary | ICD-10-CM

## 2025-07-09 DIAGNOSIS — I10 PRIMARY HYPERTENSION: ICD-10-CM

## 2025-07-09 PROCEDURE — 3075F SYST BP GE 130 - 139MM HG: CPT

## 2025-07-09 PROCEDURE — 3079F DIAST BP 80-89 MM HG: CPT

## 2025-07-09 PROCEDURE — 3008F BODY MASS INDEX DOCD: CPT

## 2025-07-09 PROCEDURE — 99214 OFFICE O/P EST MOD 30 MIN: CPT

## 2025-07-09 ASSESSMENT — PATIENT HEALTH QUESTIONNAIRE - PHQ9
1. LITTLE INTEREST OR PLEASURE IN DOING THINGS: SEVERAL DAYS
2. FEELING DOWN, DEPRESSED OR HOPELESS: SEVERAL DAYS
SUM OF ALL RESPONSES TO PHQ9 QUESTIONS 1 AND 2: 2

## 2025-07-09 ASSESSMENT — COLUMBIA-SUICIDE SEVERITY RATING SCALE - C-SSRS
6. HAVE YOU EVER DONE ANYTHING, STARTED TO DO ANYTHING, OR PREPARED TO DO ANYTHING TO END YOUR LIFE?: NO
2. HAVE YOU ACTUALLY HAD ANY THOUGHTS OF KILLING YOURSELF?: NO
1. IN THE PAST MONTH, HAVE YOU WISHED YOU WERE DEAD OR WISHED YOU COULD GO TO SLEEP AND NOT WAKE UP?: NO

## 2025-07-09 NOTE — PROGRESS NOTES
"Subjective     Patient ID: Elvie Blunt is a 65 y.o. female who presents for Follow-up (Concerned for heart rate ).      HPI  Elvie presents for concerns for her resting heart rate slightly low.  She is in a program called [facility] which has her monitoring heart rate and BP regularly.   During excersize HR , at rest HR typically in the 50s.  She denies any symptoms such as dizziness, chest pain, SOB.  She is also concerned that the automated BP cuff is reading higher than expected.    Manual BP today compared with automated cuff, automated cuff is significantly elevated.  Manual BP well controlled today at 136/88.     Patient's recent visit notes, medication and allergy lists, past medical surgical social hx, immunization, vitals, problem list, recent tests were reviewed by me for pertinence to this visit.        Review of Systems  All other systems have been reviewed and are negative except as noted in the HPI.         Objective   /88 (BP Location: Left arm, Patient Position: Sitting, BP Cuff Size: Adult)   Pulse 64   Temp 36.7 °C (98 °F) (Temporal)   Ht 1.676 m (5' 6\")   Wt 82.6 kg (182 lb)   SpO2 99%   BMI 29.38 kg/m²       Physical Exam  Vitals and nursing note reviewed.   Constitutional:       General: She is not in acute distress.     Appearance: Normal appearance.   Neck:      Vascular: No carotid bruit.   Cardiovascular:      Rate and Rhythm: Normal rate and regular rhythm.      Pulses: Normal pulses.      Heart sounds: Normal heart sounds, S1 normal and S2 normal. No murmur heard.  Pulmonary:      Effort: Pulmonary effort is normal.      Breath sounds: Normal breath sounds and air entry.   Musculoskeletal:      Cervical back: Normal range of motion and neck supple.      Right lower leg: No edema.      Left lower leg: No edema.   Lymphadenopathy:      Cervical: No cervical adenopathy.   Skin:     General: Skin is warm and dry.   Neurological:      General: No focal deficit present. "      Mental Status: She is alert. Mental status is at baseline.   Psychiatric:         Behavior: Behavior is cooperative.             Assessment & Plan  Bradycardia  Patient does have chronic history of asymptomatic bradycardia.  Heart rate from last several visits reviewed and remain with 55-65 bpm.  Last EKG showed sinus bradycardia with rate of 52 bpm in 2021.  She is completely asymptomatic and feels better then she has in years with weight loss of about 40 lbs over the last 8 months.  We will continue to monitor HR and she will follow up if any symptoms of dizziness, SOB, chest pain.        Primary hypertension  Chronic condition, stable  Continue lisinopril 20 mg daily- do not suspect lisinopril is affecting HR.  Suggest obtaining new automated BP cuff or reaching out to  for cuff calibration.   She will follow up in November as scheduled.                  Megan Bullock, ALBA-CNP

## 2025-07-12 NOTE — ASSESSMENT & PLAN NOTE
Chronic condition, stable  Continue lisinopril 20 mg daily- do not suspect lisinopril is affecting HR.  Suggest obtaining new automated BP cuff or reaching out to  for cuff calibration.   She will follow up in November as scheduled.

## 2025-11-12 ENCOUNTER — APPOINTMENT (OUTPATIENT)
Dept: PRIMARY CARE | Facility: CLINIC | Age: 65
End: 2025-11-12
Payer: COMMERCIAL